# Patient Record
Sex: FEMALE | Race: WHITE
[De-identification: names, ages, dates, MRNs, and addresses within clinical notes are randomized per-mention and may not be internally consistent; named-entity substitution may affect disease eponyms.]

---

## 2017-05-16 NOTE — OR
DATE OF PROCEDURE:  05/15/2017

 

POSTOPERATIVE DIAGNOSIS:  Colon cancer screening.

 

POSTOPERATIVE DIAGNOSES:  Diverticulosis, small transverse colon polyp.

 

PROCEDURES:  Colonoscopy to the cecum with biopsy resection of the small 
transverse 

colon polyp.

 

ANESTHESIA:  IV anesthesia with monitored anesthesia care.

 

INDICATION:  This 67-year-old white female is referred for a colonoscopy for 
colon cancer

screening.  She says her last colonoscopic exam was done twelve years ago.  I 
counseled her 

for the procedure including risks and alternatives and she gave her informed 
consent to proceed.

 

DESCRIPTION OF PROCEDURE:  The patient was placed in the left lateral decubitus 
position.

IV anesthesia was administered by the Anesthesia Service.  Time-out was held.  
A rectal

exam was performed, which was unremarkable.  The flexible video Olympus 
colonoscope 

was introduced through her anus, up her rectum, and out her colon all way to 
the cecum.  En

route, in the transverse colon, we saw a small polyp which was removed with a 
couple of

bites of the biopsy forceps.  Additionally en route, we saw a few scattered left
-sided diverticula.  

There was no bleeding or inflammation associated with them.  Once the cecum was 
reached, 

the scope was slowly withdrawn examining the mucosa throughout.  No additional 
mucosal 

abnormalities were noted.  The scope was retroflexed in the rectum with the 
distal rectum 

appearing unremarkable.  The scope was straightened and removed.  She tolerated 
the 

procedure well.

 

 

 

 

Candido Dobbins MD

DD:  05/15/2017 08:17:36

DT:  05/15/2017 16:27:08

Job #:  [id number]

MTDD

## 2020-10-22 ENCOUNTER — HOSPITAL ENCOUNTER (EMERGENCY)
Dept: HOSPITAL 11 - JP.ED | Age: 70
Discharge: HOME | End: 2020-10-22
Payer: MEDICARE

## 2020-10-22 VITALS — DIASTOLIC BLOOD PRESSURE: 74 MMHG | SYSTOLIC BLOOD PRESSURE: 141 MMHG | HEART RATE: 89 BPM

## 2020-10-22 DIAGNOSIS — Z88.0: ICD-10-CM

## 2020-10-22 DIAGNOSIS — Z79.899: ICD-10-CM

## 2020-10-22 DIAGNOSIS — R10.31: Primary | ICD-10-CM

## 2020-10-22 DIAGNOSIS — K21.9: ICD-10-CM

## 2020-10-22 DIAGNOSIS — E03.9: ICD-10-CM

## 2020-10-22 NOTE — EDM.PDOC
ED HPI GENERAL MEDICAL PROBLEM





- General


Chief Complaint: Abdominal Pain


Stated Complaint: FROM [facility]


Time Seen by Provider: 10/22/20 12:40


Source of Information: Reports: Patient


History Limitations: Reports: No Limitations





- History of Present Illness


INITIAL COMMENTS - FREE TEXT/NARRATIVE: 





70-year-old female sent in from the clinic for a CT of her abdomen.  She is ex

periencing right lower quadrant pain for the past 12 to 16 hours, it is somewhat

better since taking Tylenol but no other injury.  She was evaluated at the 

clinic, labs were drawn and although she has a history of diverticulitis her 

symptoms are usually on the left side of the right, so she was sent in for CT 

scan.  No fevers or chills, no nausea or vomiting.  Decreased appetite.


Onset: Unknown/Unsure


Duration: Other (Pain seemed to resolve on the way to the emergency room)





- Related Data


                                    Allergies











Allergy/AdvReac Type Severity Reaction Status Date / Time


 


Penicillins Allergy  Other Verified 05/12/17 08:06











Home Meds: 


                                    Home Meds





Levothyroxine Sodium [Synthroid] 125 mg PO DAILY 08/15/14 [History]


Calcium Carb/Vit D3/Minerals [Calcium 600+D Plus Minerals] 1 tab PO DAILY 

05/12/17 [History]


Cholecalciferol (Vitamin D3) [Vitamin D3] 1,000 units PO DAILY 05/12/17 [Histor

y]


Cyanocobalamin (Vitamin B-12) [Vitamin B-12] 50 mcg PO DAILY 05/12/17 [History]


Fluticasone Propionate [Flonase] 2 sprays SILVA DAILY 05/12/17 [History]


Magnesium 30 mg PO DAILY 05/12/17 [History]


Omega-3S/DHA/Epa/Fish Oil/D3 [Omega-3 + D Softgel] 1 tab PO DAILY 05/12/17 

[History]


Omeprazole Magnesium [Prilosec Otc] 40 mg PO DAILY 05/12/17 [History]


Ezetimibe [Zetia] 10 mg PO DAILY 10/22/20 [History]











Past Medical History


HEENT History: Reports: Impaired Vision, Sinusitis


Other HEENT History: wears glasses


Cardiovascular History: Reports: High Cholesterol


Gastrointestinal History: Reports: Diverticulosis, GERD, Hiatal Hernia


Genitourinary History: Reports: None


Musculoskeletal History: Reports: Other (See Below)


Other Musculoskeletal History: ostepenia


Endocrine/Metabolic History: Reports: Hypothyroidism


Oncologic (Cancer) History: Reports: Thyroid





- Infectious Disease History


Infectious Disease History: Reports: Chicken Pox





- Past Surgical History


Cardiovascular Surgical History: Reports: None


GI Surgical History: Reports: Colonoscopy, EGD, Esophageal Dilatation


Female  Surgical History: Reports: Breast Biopsy


Endocrine Surgical History: Reports: Thyroidectomy


Musculoskeletal Surgical History: Reports: None


Oncologic Surgical History: Reports: None


Dermatological Surgical History: Reports: None





Social & Family History





- Tobacco Use


Tobacco Use Status *Q: Never Tobacco User





- Caffeine Use


Caffeine Use: Reports: Coffee, Tea





ED ROS GENERAL





- Review of Systems


Review Of Systems: See Below


Constitutional: Denies: Fever, Chills


Respiratory: Denies: Shortness of Breath


Cardiovascular: Denies: Chest Pain


GI/Abdominal: Reports: Abdominal Pain.  Denies: Nausea, Vomiting


Skin: Reports: No Symptoms


Neurological: Reports: No Symptoms





ED EXAM, GI/ABD





- Physical Exam


Exam: See Below


Exam Limited By: No Limitations


General Appearance: Alert, No Apparent Distress


Eyes: Bilateral: Normal Appearance


Head: Atraumatic


Neck: Supple


Respiratory/Chest: Lungs Clear


Cardiovascular: Regular Rate, Rhythm


GI/Abdominal Exam: Normal Bowel Sounds, Soft, Tender (Patient does react with 

some moderate tenderness to palpation across the lower abdomen but no guarding 

or rebound)





Course





- Vital Signs


Last Recorded V/S: 


                                Last Vital Signs











Temp  96.8 F L  10/22/20 12:32


 


Pulse  89   10/22/20 12:32


 


Resp  16   10/22/20 12:32


 


BP  141/74 H  10/22/20 12:32


 


Pulse Ox  98   10/22/20 12:32














- Re-Assessments/Exams


Free Text/Narrative Re-Assessment/Exam: 





10/22/20 15:34


CT of the abdomen was obtained which was completely normal.  Patient symptoms 

are minimal, I asked her continue with Tylenol as needed, diet as tolerated, ret

urn if worsening such as fever or increased pain.





Departure





- Departure


Time of Disposition: 13:43


Disposition: Home, Self-Care 01


Clinical Impression: 


Abdominal pain


Qualifiers:


 Abdominal location: right lower quadrant Qualified Code(s): R10.31 - Right 

lower quadrant pain








- Discharge Information


Instructions:  Abdominal Pain, Adult, Easy-to-Read


Referrals: 


Gee Coy MD [Primary Care Provider] - 


Forms:  ED Department Discharge


Care Plan Goals: 


Increase diet and activity as tolerated, continue any of your current 

medications and return anytime if worsening or concerns.





Sepsis Event Note (ED)





- Evaluation


Sepsis Screening Result: No Definite Risk





- Focused Exam


Vital Signs: 


                                   Vital Signs











  Temp Pulse Resp BP Pulse Ox


 


 10/22/20 12:32  96.8 F L  89  16  141/74 H  98


 


 10/22/20 12:31  96.8 F L  89  16  141/74 H  98

## 2020-10-22 NOTE — CT
Abdomen Pelvis wo Cont

 

CLINICAL HISTORY: Lower abdominal pain 

 

COMPARISON: None.

 

TECHNIQUE: Axial tomographic images are obtained from the dome of the diaphragm

to the pubic symphysis without IV contrast enhancement. No contrast was used.

The dosage reduction and iterative reconstruction techniques employed.

 

FINDINGS: The lung bases are clear. The liver shows no mass or biliary

dilatation. The gallbladder has a normal appearance. There is a small hiatal

hernia. There are some calcified lymph nodes in the epigastric region The spleen

has a normal size and shape. The pancreas shows no mass or inflammatory change.

The adrenal glands appear normal bilaterally. There is mild left-sided

pelvocaliectasis. The there is some mixed density in the UPJ. No stone is

identified. A urothelial lesion is not excluded. The bladder has a normal

contour. The aorta shows minimal plaque. There is no suspicious retroperitoneal

adenopathy.

The small intestinal configuration is nonacute. The appendix has a normal

contour. The there is a tiny appendicolith in its midportion. Abdominal pelvic

fat planes are well demarcated.

 

 

IMPRESSION: Mild left-sided pelvocaliectasis. The questionable soft tissue

filling defect in the proximal ureter. The urothelial lesion is not excluded.

 

No mass, adenopathy or inflammatory change

 

Nonacute intestinal configuration

## 2024-10-22 ENCOUNTER — HOSPITAL ENCOUNTER (OUTPATIENT)
Dept: HOSPITAL 11 - JP.SDS | Age: 74
Discharge: HOME | End: 2024-10-22
Attending: SURGERY
Payer: MEDICARE

## 2024-10-22 VITALS — HEART RATE: 80 BPM

## 2024-10-22 VITALS — DIASTOLIC BLOOD PRESSURE: 50 MMHG | SYSTOLIC BLOOD PRESSURE: 99 MMHG

## 2024-10-22 DIAGNOSIS — K21.9: ICD-10-CM

## 2024-10-22 DIAGNOSIS — E78.5: ICD-10-CM

## 2024-10-22 DIAGNOSIS — R10.13: Primary | ICD-10-CM

## 2024-10-22 PROCEDURE — 00731 ANES UPR GI NDSC PX NOS: CPT

## 2024-10-22 PROCEDURE — 43239 EGD BIOPSY SINGLE/MULTIPLE: CPT
